# Patient Record
Sex: MALE | Race: BLACK OR AFRICAN AMERICAN | NOT HISPANIC OR LATINO | URBAN - METROPOLITAN AREA
[De-identification: names, ages, dates, MRNs, and addresses within clinical notes are randomized per-mention and may not be internally consistent; named-entity substitution may affect disease eponyms.]

---

## 2022-03-21 ENCOUNTER — LAB OUTSIDE AN ENCOUNTER (OUTPATIENT)
Dept: URBAN - METROPOLITAN AREA CLINIC 118 | Facility: CLINIC | Age: 47
End: 2022-03-21

## 2022-03-21 ENCOUNTER — OFFICE VISIT (OUTPATIENT)
Dept: URBAN - METROPOLITAN AREA CLINIC 118 | Facility: CLINIC | Age: 47
End: 2022-03-21
Payer: MEDICARE

## 2022-03-21 VITALS
HEIGHT: 71 IN | TEMPERATURE: 98.2 F | WEIGHT: 206.8 LBS | HEART RATE: 76 BPM | DIASTOLIC BLOOD PRESSURE: 80 MMHG | SYSTOLIC BLOOD PRESSURE: 128 MMHG | BODY MASS INDEX: 28.95 KG/M2

## 2022-03-21 DIAGNOSIS — K70.30 ALCOHOLIC CIRRHOSIS OF LIVER WITHOUT ASCITES: ICD-10-CM

## 2022-03-21 PROCEDURE — 99204 OFFICE O/P NEW MOD 45 MIN: CPT | Performed by: INTERNAL MEDICINE

## 2022-03-21 NOTE — HPI-TODAY'S VISIT:
reports some pruritis  S/P CCY 2022 referred for cirrhosis labs from 2021 shows no HBV/HCV needs twinrix no recent labs for MELD  reports heavy etoh in the past last etoh was about 2 years ago  PeTH 5/2021 was neg  MRI 8/2020    1. Hepatic cirrhosis with portal hypertension evidenced by collateral venous channels including paraesophageal varices and spontaneous splenorenal shunt and splenomegaly. 2. No focal liver mass identified to suggest hepatocellular carcinoma.    CT 7/2020  1. Cirrhosis with sequela of portal hypertension in the form of extensive upper abdominal varices and splenomegaly. 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 Some small ill-defined liver hypodensities are indeterminate. Recommend characterization with nonemergent contrast-enhanced abdominal MRI.   2. CBD is borderline dilated up to 8 mm with mild central intrahepatic biliary ductal dilatation. No conspicuous obstructing mass or calculus by CT. MRI/MRCP should be considered if there is concern for biliary obstruction.   3. Nonspecific hypodensity along the posterior margin of the gastric pylorus and anterior margin of the pancreatic head. Pancreatitis considered unlikely given today's normal lipase level. Findings could reflect sequela of gastritis/peptic ulcer disease. The posterior aspect of the gastric pyloric wall is ill-defined but there are no overt findings of perforation or drainable collection. Attention on follow-up imaging recommended to ensure resolution.   4. Multiple compression deformities of thoracolumbar vertebral bodies. L1 vertebral body compression deformity is age-indeterminate.   5. Other incidental findings as above.   Approved By: Cricket Salinas MD  7/11/2020 12:51 AM  [id number]   7/2020  Cholelithiasis with trace pericholecystic fluid. Mild intrahepatic biliary ductal dilatation. Findings are equivocal for acute cholecystitis. If there is clinical concern, consider HIDA scan.   Hepatic cirrhosis with a 1.8 cm solid mass in the right hepatic lobe. Recommend contrast-enhanced MRI of the abdomen for further evaluation as hepatocellular carcinoma is not excluded.  Dr HarperGD/colon 2019  POSTOPERATIVE DIAGNOSES: 1.  No polyps or masses found.  No bleeding source found.  2.  Diverticulosis, ascending and proximal transverse colon  3.  Medium sized, non-bleeding internal hemorrhoids   POSTOPERATIVE DIAGNOSES:  1.  Three columns, grade 2, non-bleeding varices with no stigmata of bleeding.   2.  Moderate portal hypertensive gastropathy, body and antrum of stomach.   3.  3 cm, clean based ulcer in duodenal bulb with small flat red spots but no visible vessels.   4.  1 cm lipoma in second portion of duodenum    RECOMMENDATIONS:  1.  Pantoprazole 40 mg PO BID  2.  Octreotide infusion at 50 mcg/hour IV for at least 72 hours  3.  Avoid aspirin and NSAID use 4.  Repeat EGD needed in 8 weeks  5.  Follow serial hgb levels.  Transfuse as needed to keep hgb > 7.  6.  Regular, high fiber, 2 gram sodium restricted diet 7.  Abstain from alcohol use.   8.  Nadolol 20 mg PO daily to reduce risk of future bleeding from esophageal varices, PHG.

## 2022-03-23 LAB
A/G RATIO: 0.9
ALBUMIN: 3.1
ALKALINE PHOSPHATASE: 226
ALT (SGPT): 24
AST (SGOT): 43
BASO (ABSOLUTE): 0
BASOS: 1
BILIRUBIN, TOTAL: 2
BUN/CREATININE RATIO: 13
BUN: 9
CALCIUM: 9.2
CARBON DIOXIDE, TOTAL: 20
CHLORIDE: 103
CREATININE: 0.69
EGFR: 114
EOS (ABSOLUTE): 0.1
EOS: 2
GGT: 41
GLOBULIN, TOTAL: 3.3
GLUCOSE: 190
HEMATOCRIT: 34.6
HEMATOLOGY COMMENTS:: (no result)
HEMOGLOBIN: 11.7
IMMATURE CELLS: (no result)
IMMATURE GRANS (ABS): 0
IMMATURE GRANULOCYTES: 0
INR: 1.3
LYMPHS (ABSOLUTE): 1.3
LYMPHS: 33
MCH: 33.1
MCHC: 33.8
MCV: 98
MONOCYTES(ABSOLUTE): 0.4
MONOCYTES: 9
NEUTROPHILS (ABSOLUTE): 2.3
NEUTROPHILS: 55
NRBC: (no result)
PLATELETS: 82
POTASSIUM: 4.1
PROTEIN, TOTAL: 6.4
PROTHROMBIN TIME: 14
RBC: 3.53
RDW: 13.1
SODIUM: 137
WBC: 4.1

## 2022-04-11 ENCOUNTER — OFFICE VISIT (OUTPATIENT)
Dept: URBAN - METROPOLITAN AREA SURGERY CENTER 23 | Facility: SURGERY CENTER | Age: 47
End: 2022-04-11

## 2022-04-12 ENCOUNTER — OFFICE VISIT (OUTPATIENT)
Dept: URBAN - METROPOLITAN AREA SURGERY CENTER 23 | Facility: SURGERY CENTER | Age: 47
End: 2022-04-12

## 2022-04-18 ENCOUNTER — TELEPHONE ENCOUNTER (OUTPATIENT)
Dept: URBAN - METROPOLITAN AREA CLINIC 118 | Facility: CLINIC | Age: 47
End: 2022-04-18

## 2022-04-18 ENCOUNTER — OFFICE VISIT (OUTPATIENT)
Dept: URBAN - METROPOLITAN AREA CLINIC 117 | Facility: CLINIC | Age: 47
End: 2022-04-18
Payer: MEDICARE

## 2022-04-18 DIAGNOSIS — K70.30 ALCOHOLIC CIRRHOSIS: ICD-10-CM

## 2022-04-18 DIAGNOSIS — K76.89 LIVER LESION: ICD-10-CM

## 2022-04-18 PROCEDURE — 76705 ECHO EXAM OF ABDOMEN: CPT | Performed by: INTERNAL MEDICINE

## 2022-04-21 ENCOUNTER — LAB OUTSIDE AN ENCOUNTER (OUTPATIENT)
Dept: URBAN - METROPOLITAN AREA CLINIC 92 | Facility: CLINIC | Age: 47
End: 2022-04-21

## 2022-04-21 ENCOUNTER — TELEPHONE ENCOUNTER (OUTPATIENT)
Dept: URBAN - METROPOLITAN AREA CLINIC 92 | Facility: CLINIC | Age: 47
End: 2022-04-21

## 2022-04-28 ENCOUNTER — OFFICE VISIT (OUTPATIENT)
Dept: URBAN - METROPOLITAN AREA SURGERY CENTER 23 | Facility: SURGERY CENTER | Age: 47
End: 2022-04-28

## 2022-04-30 ENCOUNTER — TELEPHONE ENCOUNTER (OUTPATIENT)
Dept: URBAN - METROPOLITAN AREA CLINIC 121 | Facility: CLINIC | Age: 47
End: 2022-04-30

## 2022-05-01 ENCOUNTER — TELEPHONE ENCOUNTER (OUTPATIENT)
Dept: URBAN - METROPOLITAN AREA CLINIC 121 | Facility: CLINIC | Age: 47
End: 2022-05-01

## 2022-05-02 ENCOUNTER — CLAIMS CREATED FROM THE CLAIM WINDOW (OUTPATIENT)
Dept: URBAN - METROPOLITAN AREA CLINIC 4 | Facility: CLINIC | Age: 47
End: 2022-05-02
Payer: MEDICARE

## 2022-05-02 ENCOUNTER — OFFICE VISIT (OUTPATIENT)
Dept: URBAN - METROPOLITAN AREA SURGERY CENTER 23 | Facility: SURGERY CENTER | Age: 47
End: 2022-05-02
Payer: MEDICARE

## 2022-05-02 DIAGNOSIS — K21.9 ACID REFLUX: ICD-10-CM

## 2022-05-02 DIAGNOSIS — K29.60 OTHER GASTRITIS WITHOUT BLEEDING: ICD-10-CM

## 2022-05-02 DIAGNOSIS — K26.9 CHILDHOOD DUODENAL ULCER: ICD-10-CM

## 2022-05-02 DIAGNOSIS — K21.9 GASTRO-ESOPHAGEAL REFLUX DISEASE WITHOUT ESOPHAGITIS: ICD-10-CM

## 2022-05-02 DIAGNOSIS — K29.60 ADENOPAPILLOMATOSIS GASTRICA: ICD-10-CM

## 2022-05-02 DIAGNOSIS — K76.6 CLINICALLY SIGNIFICANT PORTAL HYPERTENSION: ICD-10-CM

## 2022-05-02 DIAGNOSIS — K31.89 ACQUIRED DEFORMITY OF DUODENUM: ICD-10-CM

## 2022-05-02 DIAGNOSIS — B96.81 HELICOBACTER PYLORI [H. PYLORI] AS THE CAUSE OF DISEASES CLASSIFIED ELSEWHERE: ICD-10-CM

## 2022-05-02 DIAGNOSIS — B96.81 BACTERIAL INFECTION DUE TO H. PYLORI: ICD-10-CM

## 2022-05-02 DIAGNOSIS — I85.10 ESOPH VARICE OTHER DIS: ICD-10-CM

## 2022-05-02 PROCEDURE — 43239 EGD BIOPSY SINGLE/MULTIPLE: CPT | Performed by: INTERNAL MEDICINE

## 2022-05-02 PROCEDURE — 88342 IMHCHEM/IMCYTCHM 1ST ANTB: CPT | Performed by: PATHOLOGY

## 2022-05-02 PROCEDURE — 88312 SPECIAL STAINS GROUP 1: CPT | Performed by: PATHOLOGY

## 2022-05-02 PROCEDURE — 88305 TISSUE EXAM BY PATHOLOGIST: CPT | Performed by: PATHOLOGY

## 2022-05-02 PROCEDURE — G8907 PT DOC NO EVENTS ON DISCHARG: HCPCS | Performed by: INTERNAL MEDICINE

## 2022-05-02 RX ORDER — PANTOPRAZOLE SODIUM 40 MG/1
1 TABLET TABLET, DELAYED RELEASE ORAL ONCE A DAY
Qty: 90 | Refills: 3 | OUTPATIENT
Start: 2022-05-02

## 2022-06-13 ENCOUNTER — OFFICE VISIT (OUTPATIENT)
Dept: URBAN - METROPOLITAN AREA CLINIC 118 | Facility: CLINIC | Age: 47
End: 2022-06-13

## 2022-06-13 RX ORDER — PANTOPRAZOLE SODIUM 40 MG/1
1 TABLET TABLET, DELAYED RELEASE ORAL ONCE A DAY
Qty: 90 | Refills: 3 | Status: ACTIVE | COMMUNITY
Start: 2022-05-02

## 2022-06-14 ENCOUNTER — TELEPHONE ENCOUNTER (OUTPATIENT)
Dept: URBAN - METROPOLITAN AREA CLINIC 118 | Facility: CLINIC | Age: 47
End: 2022-06-14

## 2022-06-20 ENCOUNTER — OFFICE VISIT (OUTPATIENT)
Dept: URBAN - METROPOLITAN AREA CLINIC 118 | Facility: CLINIC | Age: 47
End: 2022-06-20
Payer: MEDICARE

## 2022-06-20 ENCOUNTER — LAB OUTSIDE AN ENCOUNTER (OUTPATIENT)
Dept: URBAN - METROPOLITAN AREA CLINIC 118 | Facility: CLINIC | Age: 47
End: 2022-06-20

## 2022-06-20 ENCOUNTER — OFFICE VISIT (OUTPATIENT)
Dept: URBAN - METROPOLITAN AREA CLINIC 118 | Facility: CLINIC | Age: 47
End: 2022-06-20

## 2022-06-20 VITALS
TEMPERATURE: 98.1 F | WEIGHT: 198.4 LBS | HEART RATE: 77 BPM | HEIGHT: 71 IN | BODY MASS INDEX: 27.77 KG/M2 | DIASTOLIC BLOOD PRESSURE: 68 MMHG | SYSTOLIC BLOOD PRESSURE: 113 MMHG

## 2022-06-20 DIAGNOSIS — A04.8 H. PYLORI INFECTION: ICD-10-CM

## 2022-06-20 DIAGNOSIS — K26.9 DUODENAL ULCER: ICD-10-CM

## 2022-06-20 DIAGNOSIS — I85.00 ESOPHAGEAL VARICES DETERMINED BY ENDOSCOPY: ICD-10-CM

## 2022-06-20 DIAGNOSIS — K76.9 LIVER LESION: ICD-10-CM

## 2022-06-20 DIAGNOSIS — K70.30 ALCOHOLIC CIRRHOSIS OF LIVER WITHOUT ASCITES: ICD-10-CM

## 2022-06-20 PROCEDURE — 99214 OFFICE O/P EST MOD 30 MIN: CPT | Performed by: INTERNAL MEDICINE

## 2022-06-20 RX ORDER — AMOXICILLIN 500 MG/1
2 CAPSULES CAPSULE ORAL
Qty: 56 CAPSULE | Refills: 0 | OUTPATIENT
Start: 2022-06-20 | End: 2022-07-04

## 2022-06-20 RX ORDER — PANTOPRAZOLE SODIUM 40 MG/1
1 TABLET TABLET, DELAYED RELEASE ORAL ONCE A DAY
Qty: 90 | Refills: 3 | Status: ACTIVE | COMMUNITY
Start: 2022-05-02

## 2022-06-20 RX ORDER — CLARITHROMYCIN 500 MG/1
1 TABLET TABLET, FILM COATED ORAL
Qty: 28 TABLET | Refills: 0 | OUTPATIENT
Start: 2022-06-20 | End: 2022-07-04

## 2022-06-20 RX ORDER — OMEPRAZOLE 20 MG/1
1 CAPSULE CAPSULE, DELAYED RELEASE ORAL BID
Qty: 28 CAPSULE | Refills: 0 | OUTPATIENT
Start: 2022-06-20

## 2022-06-20 NOTE — HPI-TODAY'S VISIT:
here to treat his HPylori I diagnosed still reports some pruritis reports abd bloating   TO REVIEW S/P CCY 2022 referred for cirrhosis labs from 2021 shows no HBV/HCV needs twinrix no recent labs for MELD  reports heavy etoh in the past last etoh was about 2 years ago  PeTH 5/2021 was neg  MRI 8/2020    1. Hepatic cirrhosis with portal hypertension evidenced by collateral venous channels including paraesophageal varices and spontaneous splenorenal shunt and splenomegaly. 2. No focal liver mass identified to suggest hepatocellular carcinoma.    CT 7/2020  1. Cirrhosis with sequela of portal hypertension in the form of extensive upper abdominal varices and splenomegaly. 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 Some small ill-defined liver hypodensities are indeterminate. Recommend characterization with nonemergent contrast-enhanced abdominal MRI.   2. CBD is borderline dilated up to 8 mm with mild central intrahepatic biliary ductal dilatation. No conspicuous obstructing mass or calculus by CT. MRI/MRCP should be considered if there is concern for biliary obstruction.   3. Nonspecific hypodensity along the posterior margin of the gastric pylorus and anterior margin of the pancreatic head. Pancreatitis considered unlikely given today's normal lipase level. Findings could reflect sequela of gastritis/peptic ulcer disease. The posterior aspect of the gastric pyloric wall is ill-defined but there are no overt findings of perforation or drainable collection. Attention on follow-up imaging recommended to ensure resolution.   4. Multiple compression deformities of thoracolumbar vertebral bodies. L1 vertebral body compression deformity is age-indeterminate.   5. Other incidental findings as above.   Approved By: Cricket Salinas MD  7/11/2020 12:51 AM  [id number]   7/2020  Cholelithiasis with trace pericholecystic fluid. Mild intrahepatic biliary ductal dilatation. Findings are equivocal for acute cholecystitis. If there is clinical concern, consider HIDA scan.   Hepatic cirrhosis with a 1.8 cm solid mass in the right hepatic lobe. Recommend contrast-enhanced MRI of the abdomen for further evaluation as hepatocellular carcinoma is not excluded.  Dr HarperGD/colon 2019  POSTOPERATIVE DIAGNOSES: 1.  No polyps or masses found.  No bleeding source found.  2.  Diverticulosis, ascending and proximal transverse colon  3.  Medium sized, non-bleeding internal hemorrhoids   POSTOPERATIVE DIAGNOSES:  1.  Three columns, grade 2, non-bleeding varices with no stigmata of bleeding.   2.  Moderate portal hypertensive gastropathy, body and antrum of stomach.   3.  3 cm, clean based ulcer in duodenal bulb with small flat red spots but no visible vessels.   4.  1 cm lipoma in second portion of duodenum    RECOMMENDATIONS:  1.  Pantoprazole 40 mg PO BID  2.  Octreotide infusion at 50 mcg/hour IV for at least 72 hours  3.  Avoid aspirin and NSAID use 4.  Repeat EGD needed in 8 weeks  5.  Follow serial hgb levels.  Transfuse as needed to keep hgb > 7.  6.  Regular, high fiber, 2 gram sodium restricted diet 7.  Abstain from alcohol use.   8.  Nadolol 20 mg PO daily to reduce risk of future bleeding from esophageal varices, PHG.

## 2022-07-19 ENCOUNTER — HOSPITAL ENCOUNTER (EMERGENCY)
Dept: HOSPITAL 5 - ED | Age: 47
LOS: 1 days | Discharge: HOME | End: 2022-07-20
Payer: MEDICARE

## 2022-07-19 DIAGNOSIS — F10.20: ICD-10-CM

## 2022-07-19 DIAGNOSIS — R07.9: Primary | ICD-10-CM

## 2022-07-19 DIAGNOSIS — E11.9: ICD-10-CM

## 2022-07-19 PROCEDURE — 99283 EMERGENCY DEPT VISIT LOW MDM: CPT

## 2022-07-19 PROCEDURE — 93005 ELECTROCARDIOGRAM TRACING: CPT

## 2022-07-19 PROCEDURE — 71046 X-RAY EXAM CHEST 2 VIEWS: CPT

## 2022-07-20 VITALS — SYSTOLIC BLOOD PRESSURE: 136 MMHG | DIASTOLIC BLOOD PRESSURE: 84 MMHG

## 2022-07-20 NOTE — XRAY REPORT
CHEST 2 VIEWS 



INDICATION / CLINICAL INFORMATION: chest pain. 



COMPARISON: None available.



FINDINGS:



SUPPORT DEVICES: None.

HEART / MEDIASTINUM: Heart size and mediastinal contour appear within normal limits. 

LUNGS / PLEURA: No significant pulmonary or pleural abnormality. No pneumothorax. 

BONES: No significant osseous abnormality.

ADDITIONAL FINDINGS: No significant additional findings.



IMPRESSION:

1. No active cardiopulmonary disease.



Signer Name: Avel Holbrook II, MD 

Signed: 7/20/2022 3:42 AM

Workstation Name: [id number]

## 2022-07-20 NOTE — EMERGENCY DEPARTMENT REPORT
ED General Adult HPI





- General


Chief complaint: Pain General


Stated complaint: PAIN ACROSS CHEST X 2 WEEKS


Time Seen by Provider: 22 04:49


Source: patient


Mode of arrival: Ambulatory


Limitations: No Limitations





- History of Present Illness


Initial comments: 





46-year-old male presents emerged part complaining of a near 1 week history of 

right-sided chest pain that radiates across to the shoulder worse with deep 

breaths palpation and range of motion.  Chest pain associate with occasional 

cough but no hemoptysis no hematemesis hematochezia no wheezing no rhonchi


-: Gradual


Location: chest


Radiation: non-radiation


Quality: aching, sharp


Consistency: constant


Worsens with: movement ( and deep breathes)





- Related Data


                                  Previous Rx's











 Medication  Instructions  Recorded  Last Taken  Type


 


Ketorolac [Toradol] 10 mg PO Q6H PRN #14 22 Unknown Rx











                                    Allergies











Allergy/AdvReac Type Severity Reaction Status Date / Time


 


No Known Allergies Allergy   Verified 22 18:08














ED Review of Systems


ROS: 


Stated complaint: PAIN ACROSS CHEST X 2 WEEKS


Other details as noted in HPI





Comment: All other systems reviewed and negative





ED Past Medical Hx





- Past Medical History


Hx Diabetes: Yes


Additional medical history: Cirrhosis





- Surgical History


Hx Cholecystectomy: Yes





- Social History


Smoking Status: Never Smoker


Substance Use Type: Alcohol





- Medications


Home Medications: 


                                Home Medications











 Medication  Instructions  Recorded  Confirmed  Last Taken  Type


 


Ketorolac [Toradol] 10 mg PO Q6H PRN #14 22  Unknown Rx














ED Physical Exam





- General


Limitations: No Limitations


General appearance: alert, in no apparent distress





- Head


Head exam: Present: atraumatic, normocephalic





- Eye


Eye exam: Present: normal appearance, PERRL, EOMI.  Absent: scleral icterus, 

periorbital swelling, other


Pupils: Present: normal accommodation





- ENT


ENT exam: Present: normal exam, mucous membranes moist, TM's normal bilaterally





- Neck


Neck exam: Present: normal inspection, full ROM





- Respiratory


Respiratory exam: Present: normal lung sounds bilaterally, chest wall 

tenderness.  Absent: respiratory distress, wheezes, rales, rhonchi, accessory 

muscle use, decreased breath sounds





- Cardiovascular


Cardiovascular Exam: Present: regular rate, normal rhythm.  Absent: systolic 

murmur, diastolic murmur, rubs, gallop





- GI/Abdominal


GI/Abdominal exam: Present: soft, normal bowel sounds





- Rectal


Rectal exam: Present: deferred





- Extremities Exam


Extremities exam: Present: normal inspection





- Back Exam


Back exam: Present: normal inspection





- Neurological Exam


Neurological exam: Present: alert, oriented X3, CN II-XII intact, normal gait





- Psychiatric


Psychiatric exam: Present: normal affect, normal mood





- Skin


Skin exam: Present: warm, dry, intact, normal color.  Absent: rash, diaphoretic,

 erythema





ED Course





                                   Vital Signs











  22





  18:03


 


Temperature 98.9 F


 


Pulse Rate 98 H


 


Respiratory 14





Rate 


 


Blood Pressure 120/81


 


O2 Sat by Pulse 98





Oximetry 














ED Medical Decision Making





- Radiology Data





[facility]  


                                     [address] [place] [address]  


 


                                            XRay Report   


                                               Signed  


 


Patient: SUDEEP AGUIRRE                                                          

      MR#: [id number]  


2          


: 1975                                                                

Acct:[id number]      


 


Age/Sex: 46 / M                                                                

ADM Date: 22     


 


Loc: ED       


Attending Dr:   


 


 


Ordering Physician: ZOILA MCLAUGHLIN  


Date of Service: 22  


Procedure(s): XR chest routine 2V  


Accession Number(s): [id number]  


 


cc: ZOILA MCLAUGHLIN   


 


Fluoro Time In Minutes:   


 


CHEST 2 VIEWS   


 


 INDICATION / CLINICAL INFORMATION: chest pain.   


 


 COMPARISON: None available.  


 


 FINDINGS:  


 


 SUPPORT DEVICES: None.  


 HEART / MEDIASTINUM: Heart size and mediastinal contour appear within normal 

limits.   


 LUNGS / PLEURA: No significant pulmonary or pleural abnormality. No 

pneumothorax.   


 BONES: No significant osseous abnormality.  


 ADDITIONAL FINDINGS: No significant additional findings.  


 


 IMPRESSION:  


 1. No active cardiopulmonary disease.  


 


 Signer Name: Erin Nagy II, MD   


 Signed: 2022 3:42 AM  


 Workstation Name: [id number]   


 


 


Transcribed By: JAH  


Dictated By: ERIN NAGY II, MD  


Electronically Authenticated By: ERIN NAGY II, MD    


Signed Date/Time: 22                                


 


 


 


DD/DT: 22                                                            

  


TD/TT:





- Medical Decision Making





This patient presents with chest pain that is very unlikely angina or acute 

coronary syndrome. The emergency department evaluation has not identified any 

cause for suspicion that this chest pain has a cardiac etiology. Based on their 

history, EKG (which showed no evidence of ischemia or infarction) and imaging, 

in addition to the patient's physical exam, I see no evidence at this time for a

 malignant etiology for the patient's chest pain. There is no acute evidence for

 pulmonary embolus, acute myocardial infarction, pneumothorax, Boerhaeve 

syndrome, cardiac tamponade, thoracic artery dissection, or any other emergent 

cardiac, pulmonary or aortic pathology. Given the low pre-test probability for 

cardiac etiology of chest pain and the absence of any sign of ischemia or 

infarction, discharge for outpatient follow-up and further evaluation is 

reasonable.





I have explained to the patient that even though a cardiac problem is very 

unlikely, follow-up and further testing is required to reduce further the 

already small uncertainty that exists. Other life-threatening diagnoses have 

been considered. The patient understands the need to return immediately if their

 symptoms worsen or they develop any new symptoms, and not to engage in any 

significant exertional activity until follow-up is obtained.


Critical care attestation.: 


If time is entered above; I have spent that time in minutes in the direct care 

of this critically ill patient, excluding procedure time.








ED Disposition


Clinical Impression: 


 Chest pain





Disposition: 01 HOME / SELF CARE / HOMELESS


Is pt being admited?: No


Does the pt Need Aspirin: No


Condition: Stable


Instructions:  Nonspecific Chest Pain, Adult, Costochondritis


Prescriptions: 


Ketorolac [Toradol] 10 mg PO Q6H PRN #14


 PRN Reason: Pain


Referrals: 


ZULEYKA DALEY MD [Staff Physician] - 3-5 Days

## 2022-07-22 ENCOUNTER — TELEPHONE ENCOUNTER (OUTPATIENT)
Dept: URBAN - METROPOLITAN AREA CLINIC 118 | Facility: CLINIC | Age: 47
End: 2022-07-22

## 2022-07-22 NOTE — ELECTROCARDIOGRAPH REPORT
[facility]

                                       

Test Date:    2022               Test Time:    18:12:06

Pat Name:     SUDEEP AGUIRRE            Department:   

Patient ID:   [id number]          Room:          

Gender:       M                        Technician:   [email or web address]

:          1975               Requested By: MAC PIEDRA

Order Number: [id number]              Reading MD:   Shonda Hickey

                                 Measurements

Intervals                              Axis          

Rate:         86                       P:            5

SD:           157                      QRS:          -17

QRSD:         93                       T:            64

QT:           397                                    

QTc:          475                                    

                           Interpretive Statements

Sinus bradycardia

Prolonged SD interval

Poor R wave progression

Left ventricular hypertrophy

No previous ECG available for comparison

Electronically Signed On 2022 14:03:12 EDT by Shonda Hickey

## 2022-07-25 ENCOUNTER — LAB OUTSIDE AN ENCOUNTER (OUTPATIENT)
Dept: URBAN - METROPOLITAN AREA CLINIC 118 | Facility: CLINIC | Age: 47
End: 2022-07-25

## 2022-07-31 ENCOUNTER — TELEPHONE ENCOUNTER (OUTPATIENT)
Dept: URBAN - METROPOLITAN AREA CLINIC 92 | Facility: CLINIC | Age: 47
End: 2022-07-31

## 2022-08-15 ENCOUNTER — LAB OUTSIDE AN ENCOUNTER (OUTPATIENT)
Dept: URBAN - METROPOLITAN AREA CLINIC 118 | Facility: CLINIC | Age: 47
End: 2022-08-15

## 2022-08-15 ENCOUNTER — OFFICE VISIT (OUTPATIENT)
Dept: URBAN - METROPOLITAN AREA CLINIC 118 | Facility: CLINIC | Age: 47
End: 2022-08-15
Payer: MEDICARE

## 2022-08-15 VITALS
HEART RATE: 78 BPM | SYSTOLIC BLOOD PRESSURE: 117 MMHG | TEMPERATURE: 98.1 F | WEIGHT: 196.4 LBS | DIASTOLIC BLOOD PRESSURE: 79 MMHG | BODY MASS INDEX: 27.5 KG/M2 | HEIGHT: 71 IN

## 2022-08-15 DIAGNOSIS — K76.9 LIVER LESION: ICD-10-CM

## 2022-08-15 DIAGNOSIS — I85.00 ESOPHAGEAL VARICES DETERMINED BY ENDOSCOPY: ICD-10-CM

## 2022-08-15 DIAGNOSIS — A04.8 H. PYLORI INFECTION: ICD-10-CM

## 2022-08-15 DIAGNOSIS — C22.0 HCC (HEPATOCELLULAR CARCINOMA): ICD-10-CM

## 2022-08-15 DIAGNOSIS — K26.9 DUODENAL ULCER: ICD-10-CM

## 2022-08-15 DIAGNOSIS — K70.30 ALCOHOLIC CIRRHOSIS OF LIVER WITHOUT ASCITES: ICD-10-CM

## 2022-08-15 DIAGNOSIS — D49.0 IPMN (INTRADUCTAL PAPILLARY MUCINOUS NEOPLASM): ICD-10-CM

## 2022-08-15 PROCEDURE — 99214 OFFICE O/P EST MOD 30 MIN: CPT | Performed by: INTERNAL MEDICINE

## 2022-08-15 RX ORDER — OMEPRAZOLE 20 MG/1
1 CAPSULE CAPSULE, DELAYED RELEASE ORAL BID
Qty: 28 CAPSULE | Refills: 0 | OUTPATIENT

## 2022-08-15 RX ORDER — OMEPRAZOLE 20 MG/1
1 CAPSULE CAPSULE, DELAYED RELEASE ORAL BID
Qty: 28 CAPSULE | Refills: 0 | Status: ACTIVE | COMMUNITY
Start: 2022-06-20

## 2022-08-15 RX ORDER — PANTOPRAZOLE SODIUM 40 MG/1
1 TABLET TABLET, DELAYED RELEASE ORAL ONCE A DAY
Qty: 90 | Refills: 3 | Status: ACTIVE | COMMUNITY
Start: 2022-05-02

## 2022-08-15 NOTE — HPI-TODAY'S VISIT:
I found HCC, referred to [facility] transplant: [facility] [facility] [place] Multidisciplinary Liver Cancer Conference Summary Report   Rell Luis was discussed at our weekly multidisciplinary liver cancer conference using [employer] on 7/26/2022.   We presented and reviewed the patient's MRI 7/14/2022 with interventional radiologist Dr. Honorio Rojas and have the following findings and recommended treatment plan:     The images revealed:  -Arterial enhancing lesion with washout, located near heart. LR-5     Main portal vein patent?: yes, but not mentioned in discussion.             If PV thrombus, is it tumor thrombus?: not applicable  HCC within Basil Criteria?: not applicable; NAC for transplant.     The multidisciplinary care recommendation is:  Pt needs to schedule office visit with Dr. Rosales in next 1-2 weeks. Plan for Y-90 if tbili ok. If tbili Marginal, plan for TACE.   Note routed to primary team to inform patient of above.   Electronically signed by Kimberley Sorto RN on 7/27/2022 10:45 AM.    here to treat his HPylori I diagnosed still reports some pruritis reports abd bloating   TO REVIEW S/P CCY 2022 referred for cirrhosis labs from 2021 shows no HBV/HCV needs twinrix no recent labs for MELD  reports heavy etoh in the past last etoh was about 2 years ago  PeTH 5/2021 was neg  MRI 8/2020    1. Hepatic cirrhosis with portal hypertension evidenced by collateral venous channels including paraesophageal varices and spontaneous splenorenal shunt and splenomegaly. 2. No focal liver mass identified to suggest hepatocellular carcinoma.    CT 7/2020  1. Cirrhosis with sequela of portal hypertension in the form of extensive upper abdominal varices and splenomegaly. 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 Some small ill-defined liver hypodensities are indeterminate. Recommend characterization with nonemergent contrast-enhanced abdominal MRI.   2. CBD is borderline dilated up to 8 mm with mild central intrahepatic biliary ductal dilatation. No conspicuous obstructing mass or calculus by CT. MRI/MRCP should be considered if there is concern for biliary obstruction.   3. Nonspecific hypodensity along the posterior margin of the gastric pylorus and anterior margin of the pancreatic head. Pancreatitis considered unlikely given today's normal lipase level. Findings could reflect sequela of gastritis/peptic ulcer disease. The posterior aspect of the gastric pyloric wall is ill-defined but there are no overt findings of perforation or drainable collection. Attention on follow-up imaging recommended to ensure resolution.   4. Multiple compression deformities of thoracolumbar vertebral bodies. L1 vertebral body compression deformity is age-indeterminate.   5. Other incidental findings as above.   Approved By: Cricket Salinas MD  7/11/2020 12:51 AM  [id number]   7/2020  Cholelithiasis with trace pericholecystic fluid. Mild intrahepatic biliary ductal dilatation. Findings are equivocal for acute cholecystitis. If there is clinical concern, consider HIDA scan.   Hepatic cirrhosis with a 1.8 cm solid mass in the right hepatic lobe. Recommend contrast-enhanced MRI of the abdomen for further evaluation as hepatocellular carcinoma is not excluded.  Dr HarperGD/colon 2019  POSTOPERATIVE DIAGNOSES: 1.  No polyps or masses found.  No bleeding source found.  2.  Diverticulosis, ascending and proximal transverse colon  3.  Medium sized, non-bleeding internal hemorrhoids   POSTOPERATIVE DIAGNOSES:  1.  Three columns, grade 2, non-bleeding varices with no stigmata of bleeding.   2.  Moderate portal hypertensive gastropathy, body and antrum of stomach.   3.  3 cm, clean based ulcer in duodenal bulb with small flat red spots but no visible vessels.   4.  1 cm lipoma in second portion of duodenum    RECOMMENDATIONS:  1.  Pantoprazole 40 mg PO BID  2.  Octreotide infusion at 50 mcg/hour IV for at least 72 hours  3.  Avoid aspirin and NSAID use 4.  Repeat EGD needed in 8 weeks  5.  Follow serial hgb levels.  Transfuse as needed to keep hgb > 7.  6.  Regular, high fiber, 2 gram sodium restricted diet 7.  Abstain from alcohol use.   8.  Nadolol 20 mg PO daily to reduce risk of future bleeding from esophageal varices, PHG.

## 2022-10-28 LAB
ABSOLUTE BASOPHILS: 19
ABSOLUTE EOSINOPHILS: 41
ABSOLUTE LYMPHOCYTES: 662
ABSOLUTE MONOCYTES: 338
ABSOLUTE NEUTROPHILS: 1642
AFP, SERUM, TUMOR MARKER: 3.1
ALBUMIN/GLOBULIN RATIO: 0.8
ALBUMIN: 2.8
ALKALINE PHOSPHATASE: 159
ALT (SGPT): 27
AST (SGOT): 40
BASOPHILS: 0.7
BILIRUBIN, DIRECT: 1
BILIRUBIN, INDIRECT: 1.9
BILIRUBIN, TOTAL: 2.9
BUN/CREATININE RATIO: (no result)
CALCIUM: 8.4
CARBON DIOXIDE: 24
CHLORIDE: 109
CREATININE: 0.66
EGFR: 115
EOSINOPHILS: 1.5
GLOBULIN: 3.3
GLUCOSE: 131
HEMATOCRIT: 31.6
HEMOGLOBIN: 11.1
INR: 1.4
LYMPHOCYTES: 24.5
MCH: 32.3
MCHC: 35.1
MCV: 91.9
MONOCYTES: 12.5
MPV: 11.9
NEUTROPHILS: 60.8
PLATELET COUNT: 101
POTASSIUM: 4.4
PROTEIN, TOTAL: 6.1
PT: 14.6
RDW: 15.7
RED BLOOD CELL COUNT: 3.44
SODIUM: 141
UREA NITROGEN (BUN): 7
WHITE BLOOD CELL COUNT: 2.7

## 2022-10-31 ENCOUNTER — LAB OUTSIDE AN ENCOUNTER (OUTPATIENT)
Dept: URBAN - METROPOLITAN AREA CLINIC 118 | Facility: CLINIC | Age: 47
End: 2022-10-31

## 2022-10-31 ENCOUNTER — OFFICE VISIT (OUTPATIENT)
Dept: URBAN - METROPOLITAN AREA CLINIC 118 | Facility: CLINIC | Age: 47
End: 2022-10-31
Payer: MEDICARE

## 2022-10-31 VITALS
HEART RATE: 81 BPM | WEIGHT: 191.6 LBS | HEIGHT: 71 IN | SYSTOLIC BLOOD PRESSURE: 130 MMHG | TEMPERATURE: 98.2 F | BODY MASS INDEX: 26.82 KG/M2 | DIASTOLIC BLOOD PRESSURE: 81 MMHG

## 2022-10-31 DIAGNOSIS — K76.9 LIVER LESION: ICD-10-CM

## 2022-10-31 DIAGNOSIS — K26.9 DUODENAL ULCER: ICD-10-CM

## 2022-10-31 DIAGNOSIS — I85.00 ESOPHAGEAL VARICES DETERMINED BY ENDOSCOPY: ICD-10-CM

## 2022-10-31 DIAGNOSIS — A04.8 H. PYLORI INFECTION: ICD-10-CM

## 2022-10-31 DIAGNOSIS — D49.0 IPMN (INTRADUCTAL PAPILLARY MUCINOUS NEOPLASM): ICD-10-CM

## 2022-10-31 DIAGNOSIS — K70.30 ALCOHOLIC CIRRHOSIS OF LIVER WITHOUT ASCITES: ICD-10-CM

## 2022-10-31 DIAGNOSIS — C22.0 HCC (HEPATOCELLULAR CARCINOMA): ICD-10-CM

## 2022-10-31 PROCEDURE — 99213 OFFICE O/P EST LOW 20 MIN: CPT | Performed by: INTERNAL MEDICINE

## 2022-10-31 RX ORDER — PANTOPRAZOLE SODIUM 40 MG/1
1 TABLET TABLET, DELAYED RELEASE ORAL ONCE A DAY
Qty: 90 | Refills: 3 | Status: ACTIVE | COMMUNITY
Start: 2022-05-02

## 2022-10-31 RX ORDER — OMEPRAZOLE 20 MG/1
1 CAPSULE CAPSULE, DELAYED RELEASE ORAL BID
Qty: 28 CAPSULE | Refills: 0 | Status: ACTIVE | COMMUNITY

## 2022-10-31 RX ORDER — LACTULOSE 10 G/15ML
15 ML SOLUTION ORAL ONCE A DAY
Qty: 1350 ML | Refills: 3

## 2022-10-31 NOTE — HPI-TODAY'S VISIT:
I found HCC, referred to [facility] transplant: had ablation 10/2022  Prior notes: [facility] Multidisciplinary Liver Cancer Conference Summary Report   Rell Luis was discussed at our weekly multidisciplinary liver cancer conference using [employer] on 7/26/2022.   We presented and reviewed the patient's MRI 7/14/2022 with interventional radiologist Dr. Honorio Rojas and have the following findings and recommended treatment plan:     The images revealed:  -Arterial enhancing lesion with washout, located near heart. LR-5     Main portal vein patent?: yes, but not mentioned in discussion.             If PV thrombus, is it tumor thrombus?: not applicable  HCC within Basil Criteria?: not applicable; NAC for transplant.     The multidisciplinary care recommendation is:  Pt needs to schedule office visit with Dr. Rosales in next 1-2 weeks. Plan for Y-90 if tbili ok. If tbili Marginal, plan for TACE.   Note routed to primary team to inform patient of above.   Electronically signed by Kimberley Sorto RN on 7/27/2022 10:45 AM.    here to treat his HPylori I diagnosed still reports some pruritis reports abd bloating   TO REVIEW S/P CCY 2022 referred for cirrhosis labs from 2021 shows no HBV/HCV needs twinrix no recent labs for MELD  reports heavy etoh in the past last etoh was about 2 years ago  PeTH 5/2021 was neg  MRI 8/2020    1. Hepatic cirrhosis with portal hypertension evidenced by collateral venous channels including paraesophageal varices and spontaneous splenorenal shunt and splenomegaly. 2. No focal liver mass identified to suggest hepatocellular carcinoma.    CT 7/2020  1. Cirrhosis with sequela of portal hypertension in the form of extensive upper abdominal varices and splenomegaly. 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 Some small ill-defined liver hypodensities are indeterminate. Recommend characterization with nonemergent contrast-enhanced abdominal MRI.   2. CBD is borderline dilated up to 8 mm with mild central intrahepatic biliary ductal dilatation. No conspicuous obstructing mass or calculus by CT. MRI/MRCP should be considered if there is concern for biliary obstruction.   3. Nonspecific hypodensity along the posterior margin of the gastric pylorus and anterior margin of the pancreatic head. Pancreatitis considered unlikely given today's normal lipase level. Findings could reflect sequela of gastritis/peptic ulcer disease. The posterior aspect of the gastric pyloric wall is ill-defined but there are no overt findings of perforation or drainable collection. Attention on follow-up imaging recommended to ensure resolution.   4. Multiple compression deformities of thoracolumbar vertebral bodies. L1 vertebral body compression deformity is age-indeterminate.   5. Other incidental findings as above.   Approved By: Cricket Salinas MD  7/11/2020 12:51 AM  [id number]   7/2020  Cholelithiasis with trace pericholecystic fluid. Mild intrahepatic biliary ductal dilatation. Findings are equivocal for acute cholecystitis. If there is clinical concern, consider HIDA scan.   Hepatic cirrhosis with a 1.8 cm solid mass in the right hepatic lobe. Recommend contrast-enhanced MRI of the abdomen for further evaluation as hepatocellular carcinoma is not excluded.  Dr HarperGD/colon 2019  POSTOPERATIVE DIAGNOSES: 1.  No polyps or masses found.  No bleeding source found.  2.  Diverticulosis, ascending and proximal transverse colon  3.  Medium sized, non-bleeding internal hemorrhoids   POSTOPERATIVE DIAGNOSES:  1.  Three columns, grade 2, non-bleeding varices with no stigmata of bleeding.   2.  Moderate portal hypertensive gastropathy, body and antrum of stomach.   3.  3 cm, clean based ulcer in duodenal bulb with small flat red spots but no visible vessels.   4.  1 cm lipoma in second portion of duodenum    RECOMMENDATIONS:  1.  Pantoprazole 40 mg PO BID  2.  Octreotide infusion at 50 mcg/hour IV for at least 72 hours  3.  Avoid aspirin and NSAID use 4.  Repeat EGD needed in 8 weeks  5.  Follow serial hgb levels.  Transfuse as needed to keep hgb > 7.  6.  Regular, high fiber, 2 gram sodium restricted diet 7.  Abstain from alcohol use.   8.  Nadolol 20 mg PO daily to reduce risk of future bleeding from esophageal varices, PHG.

## 2022-11-01 ENCOUNTER — ERX REFILL RESPONSE (OUTPATIENT)
Dept: URBAN - METROPOLITAN AREA CLINIC 118 | Facility: CLINIC | Age: 47
End: 2022-11-01

## 2022-11-01 RX ORDER — PANTOPRAZOLE SODIUM 40 MG/1
1 TABLET TABLET, DELAYED RELEASE ORAL ONCE A DAY
Qty: 90 | Refills: 3 | OUTPATIENT

## 2022-11-01 RX ORDER — PANTOPRAZOLE SODIUM 40 MG/1
TAKE 1 TABLET BY MOUTH EVERY DAY TABLET, DELAYED RELEASE ORAL
Qty: 90 TABLET | Refills: 0 | OUTPATIENT

## 2022-11-02 ENCOUNTER — OFFICE VISIT (OUTPATIENT)
Dept: URBAN - METROPOLITAN AREA CLINIC 117 | Facility: CLINIC | Age: 47
End: 2022-11-02

## 2022-11-03 ENCOUNTER — TELEPHONE ENCOUNTER (OUTPATIENT)
Dept: URBAN - METROPOLITAN AREA CLINIC 118 | Facility: CLINIC | Age: 47
End: 2022-11-03

## 2022-11-03 ENCOUNTER — LAB OUTSIDE AN ENCOUNTER (OUTPATIENT)
Dept: URBAN - METROPOLITAN AREA CLINIC 6 | Facility: CLINIC | Age: 47
End: 2022-11-03

## 2022-11-03 ENCOUNTER — WEB ENCOUNTER (OUTPATIENT)
Dept: URBAN - METROPOLITAN AREA CLINIC 6 | Facility: CLINIC | Age: 47
End: 2022-11-03

## 2022-11-04 ENCOUNTER — LAB OUTSIDE AN ENCOUNTER (OUTPATIENT)
Dept: URBAN - METROPOLITAN AREA CLINIC 109 | Facility: CLINIC | Age: 47
End: 2022-11-04

## 2022-11-07 ENCOUNTER — OFFICE VISIT (OUTPATIENT)
Dept: URBAN - METROPOLITAN AREA CLINIC 117 | Facility: CLINIC | Age: 47
End: 2022-11-07

## 2022-11-07 LAB — HELICOBACTER PYLORI AG, EIA, STOOL: (no result)

## 2022-11-08 ENCOUNTER — TELEPHONE ENCOUNTER (OUTPATIENT)
Dept: URBAN - METROPOLITAN AREA CLINIC 118 | Facility: CLINIC | Age: 47
End: 2022-11-08

## 2022-11-08 RX ORDER — OMEPRAZOLE 20 MG/1
1 CAPSULE CAPSULE, DELAYED RELEASE ORAL BID
Qty: 28 CAPSULE | Refills: 0 | Status: ACTIVE | COMMUNITY

## 2022-11-08 RX ORDER — LACTULOSE 10 G/15ML
15 ML SOLUTION ORAL ONCE A DAY
Qty: 1350 ML | Refills: 3 | Status: ACTIVE | COMMUNITY

## 2022-11-08 RX ORDER — PANTOPRAZOLE SODIUM 40 MG/1
TAKE 1 TABLET BY MOUTH EVERY DAY TABLET, DELAYED RELEASE ORAL
Qty: 90 TABLET | Refills: 0 | Status: ACTIVE | COMMUNITY

## 2022-11-08 RX ORDER — ONDANSETRON 4 MG/1
1 TABLET ON THE TONGUE AND ALLOW TO DISSOLVE TABLET, ORALLY DISINTEGRATING ORAL ONCE A DAY
Qty: 60 TABLET | Refills: 3 | OUTPATIENT
Start: 2022-11-09

## 2022-11-18 ENCOUNTER — TELEPHONE ENCOUNTER (OUTPATIENT)
Dept: URBAN - METROPOLITAN AREA CLINIC 63 | Facility: CLINIC | Age: 47
End: 2022-11-18

## 2022-12-06 ENCOUNTER — TELEPHONE ENCOUNTER (OUTPATIENT)
Dept: URBAN - METROPOLITAN AREA CLINIC 118 | Facility: CLINIC | Age: 47
End: 2022-12-06

## 2022-12-06 RX ORDER — LACTULOSE 10 G/15ML
15 ML SOLUTION ORAL THREE TIMES A DAY
Qty: 4050 ML | Refills: 3

## 2022-12-15 ENCOUNTER — TELEPHONE ENCOUNTER (OUTPATIENT)
Dept: URBAN - METROPOLITAN AREA CLINIC 118 | Facility: CLINIC | Age: 47
End: 2022-12-15

## 2023-02-23 ENCOUNTER — OFFICE VISIT (OUTPATIENT)
Dept: URBAN - METROPOLITAN AREA CLINIC 118 | Facility: CLINIC | Age: 48
End: 2023-02-23
Payer: MEDICARE

## 2023-02-23 ENCOUNTER — DASHBOARD ENCOUNTERS (OUTPATIENT)
Age: 48
End: 2023-02-23

## 2023-02-23 VITALS
HEIGHT: 68 IN | HEART RATE: 73 BPM | TEMPERATURE: 98.1 F | SYSTOLIC BLOOD PRESSURE: 120 MMHG | DIASTOLIC BLOOD PRESSURE: 78 MMHG | WEIGHT: 204.6 LBS | BODY MASS INDEX: 31.01 KG/M2

## 2023-02-23 DIAGNOSIS — K70.30 ALCOHOLIC CIRRHOSIS OF LIVER WITHOUT ASCITES: ICD-10-CM

## 2023-02-23 DIAGNOSIS — K76.9 LIVER LESION: ICD-10-CM

## 2023-02-23 DIAGNOSIS — A04.8 H. PYLORI INFECTION: ICD-10-CM

## 2023-02-23 DIAGNOSIS — C22.0 HCC (HEPATOCELLULAR CARCINOMA): ICD-10-CM

## 2023-02-23 DIAGNOSIS — I85.00 ESOPHAGEAL VARICES DETERMINED BY ENDOSCOPY: ICD-10-CM

## 2023-02-23 DIAGNOSIS — K26.9 DUODENAL ULCER: ICD-10-CM

## 2023-02-23 DIAGNOSIS — D49.0 IPMN (INTRADUCTAL PAPILLARY MUCINOUS NEOPLASM): ICD-10-CM

## 2023-02-23 PROBLEM — 300331000: Status: ACTIVE | Noted: 2022-06-20

## 2023-02-23 PROBLEM — 420054005: Status: ACTIVE | Noted: 2022-03-21

## 2023-02-23 PROBLEM — 51868009: Status: ACTIVE | Noted: 2022-05-02

## 2023-02-23 PROBLEM — 28670008: Status: ACTIVE | Noted: 2022-06-20

## 2023-02-23 PROBLEM — 109841003: Status: ACTIVE | Noted: 2022-08-15

## 2023-02-23 PROCEDURE — 99214 OFFICE O/P EST MOD 30 MIN: CPT | Performed by: INTERNAL MEDICINE

## 2023-02-23 RX ORDER — LACTULOSE 10 G/15ML
15 ML SOLUTION ORAL ONCE A DAY
Qty: 1350 ML | Refills: 3

## 2023-02-23 RX ORDER — LACTULOSE 10 G/15ML
15 ML SOLUTION ORAL THREE TIMES A DAY
Qty: 4050 ML | Refills: 3 | COMMUNITY

## 2023-02-23 RX ORDER — OMEPRAZOLE 20 MG/1
1 CAPSULE CAPSULE, DELAYED RELEASE ORAL BID
Qty: 28 CAPSULE | Refills: 0 | COMMUNITY

## 2023-02-23 RX ORDER — PANTOPRAZOLE SODIUM 40 MG/1
TAKE 1 TABLET BY MOUTH EVERY DAY TABLET, DELAYED RELEASE ORAL
Qty: 90 TABLET | Refills: 0 | Status: ACTIVE | COMMUNITY

## 2023-02-23 RX ORDER — ONDANSETRON 4 MG/1
1 TABLET ON THE TONGUE AND ALLOW TO DISSOLVE TABLET, ORALLY DISINTEGRATING ORAL ONCE A DAY
Qty: 60 TABLET | Refills: 3 | COMMUNITY
Start: 2022-11-09

## 2023-02-23 RX ORDER — CHOLECALCIFEROL TAB 50 MCG (2000 UNIT) 50 MCG
ONCE PER WEEK TAB ORAL
Status: ACTIVE | COMMUNITY

## 2023-02-23 RX ORDER — MELOXICAM 15 MG/1
1 TABLET TABLET ORAL ONCE A DAY
Status: ACTIVE | COMMUNITY

## 2023-02-23 NOTE — HPI-TODAY'S VISIT:
I found HCC, referred to [facility] transplant: had ablation 10/2022  no followup notes since from them, he appears to have been lost to followup  he has limited understanding  Prior notes: [facility] Multidisciplinary Liver Cancer Conference Summary Report   Rell Lusi was discussed at our weekly multidisciplinary liver cancer conference using [employer] on 7/26/2022.   We presented and reviewed the patient's MRI 7/14/2022 with interventional radiologist Dr. Honorio Rojas and have the following findings and recommended treatment plan:     The images revealed:  -Arterial enhancing lesion with washout, located near heart. LR-5     Main portal vein patent?: yes, but not mentioned in discussion.             If PV thrombus, is it tumor thrombus?: not applicable  HCC within [place] Criteria?: not applicable; NAC for transplant.     The multidisciplinary care recommendation is:  Pt needs to schedule office visit with Dr. Rosales in next 1-2 weeks. Plan for Y-90 if tbili ok. If tbili Marginal, plan for TACE.   Note routed to primary team to inform patient of above.   Electronically signed by Kimberley Sorto RN on 7/27/2022 10:45 AM.    here to treat his HPylori I diagnosed still reports some pruritis reports abd bloating   TO REVIEW S/P CCY 2022 referred for cirrhosis labs from 2021 shows no HBV/HCV needs twinrix no recent labs for MELD  reports heavy etoh in the past last etoh was about 2 years ago  PeTH 5/2021 was neg  MRI 8/2020    1. Hepatic cirrhosis with portal hypertension evidenced by collateral venous channels including paraesophageal varices and spontaneous splenorenal shunt and splenomegaly. 2. No focal liver mass identified to suggest hepatocellular carcinoma.    CT 7/2020  1. Cirrhosis with sequela of portal hypertension in the form of extensive upper abdominal varices and splenomegaly. 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 Some small ill-defined liver hypodensities are indeterminate. Recommend characterization with nonemergent contrast-enhanced abdominal MRI.   2. CBD is borderline dilated up to 8 mm with mild central intrahepatic biliary ductal dilatation. No conspicuous obstructing mass or calculus by CT. MRI/MRCP should be considered if there is concern for biliary obstruction.   3. Nonspecific hypodensity along the posterior margin of the gastric pylorus and anterior margin of the pancreatic head. Pancreatitis considered unlikely given today's normal lipase level. Findings could reflect sequela of gastritis/peptic ulcer disease. The posterior aspect of the gastric pyloric wall is ill-defined but there are no overt findings of perforation or drainable collection. Attention on follow-up imaging recommended to ensure resolution.   4. Multiple compression deformities of thoracolumbar vertebral bodies. L1 vertebral body compression deformity is age-indeterminate.   5. Other incidental findings as above.   Approved By: Cricket Salinas MD  7/11/2020 12:51 AM  [id number]   7/2020  Cholelithiasis with trace pericholecystic fluid. Mild intrahepatic biliary ductal dilatation. Findings are equivocal for acute cholecystitis. If there is clinical concern, consider HIDA scan.   Hepatic cirrhosis with a 1.8 cm solid mass in the right hepatic lobe. Recommend contrast-enhanced MRI of the abdomen for further evaluation as hepatocellular carcinoma is not excluded.  Dr HarperGD/colon 2019  POSTOPERATIVE DIAGNOSES: 1.  No polyps or masses found.  No bleeding source found.  2.  Diverticulosis, ascending and proximal transverse colon  3.  Medium sized, non-bleeding internal hemorrhoids   POSTOPERATIVE DIAGNOSES:  1.  Three columns, grade 2, non-bleeding varices with no stigmata of bleeding.   2.  Moderate portal hypertensive gastropathy, body and antrum of stomach.   3.  3 cm, clean based ulcer in duodenal bulb with small flat red spots but no visible vessels.   4.  1 cm lipoma in second portion of duodenum    RECOMMENDATIONS:  1.  Pantoprazole 40 mg PO BID  2.  Octreotide infusion at 50 mcg/hour IV for at least 72 hours  3.  Avoid aspirin and NSAID use 4.  Repeat EGD needed in 8 weeks  5.  Follow serial hgb levels.  Transfuse as needed to keep hgb > 7.  6.  Regular, high fiber, 2 gram sodium restricted diet 7.  Abstain from alcohol use.   8.  Nadolol 20 mg PO daily to reduce risk of future bleeding from esophageal varices, PHG.

## 2023-03-09 ENCOUNTER — ERX REFILL RESPONSE (OUTPATIENT)
Dept: URBAN - METROPOLITAN AREA CLINIC 118 | Facility: CLINIC | Age: 48
End: 2023-03-09

## 2023-03-09 RX ORDER — PANTOPRAZOLE SODIUM 40 MG/1
TAKE 1 TABLET EVERY DAY TABLET, DELAYED RELEASE ORAL
Qty: 90 TABLET | Refills: 0 | OUTPATIENT

## 2023-04-17 ENCOUNTER — OUT OF OFFICE VISIT (OUTPATIENT)
Dept: URBAN - METROPOLITAN AREA MEDICAL CENTER 41 | Facility: MEDICAL CENTER | Age: 48
End: 2023-04-17
Payer: MEDICARE

## 2023-04-17 DIAGNOSIS — Z85.05 PERSONAL HISTORY OF LIVER CANCER: ICD-10-CM

## 2023-04-17 DIAGNOSIS — K70.30 ALC (ALCOHOLIC LIVER CIRRHOSIS): ICD-10-CM

## 2023-04-17 PROCEDURE — 99222 1ST HOSP IP/OBS MODERATE 55: CPT | Performed by: INTERNAL MEDICINE

## 2023-04-17 PROCEDURE — G8427 DOCREV CUR MEDS BY ELIG CLIN: HCPCS | Performed by: INTERNAL MEDICINE

## 2023-04-30 ENCOUNTER — OUT OF OFFICE VISIT (OUTPATIENT)
Dept: URBAN - METROPOLITAN AREA MEDICAL CENTER 41 | Facility: MEDICAL CENTER | Age: 48
End: 2023-04-30
Payer: MEDICARE

## 2023-04-30 DIAGNOSIS — K76.82 HEPATIC ENCEPHALOPATHY: ICD-10-CM

## 2023-04-30 DIAGNOSIS — K72.00 ACUTE AND SUBACUTE HEPATIC FAILURE WITHOUT COMA: ICD-10-CM

## 2023-04-30 DIAGNOSIS — R18.8 ABDOMINAL ASCITES: ICD-10-CM

## 2023-04-30 PROCEDURE — 99232 SBSQ HOSP IP/OBS MODERATE 35: CPT | Performed by: INTERNAL MEDICINE

## 2023-05-01 ENCOUNTER — OFFICE VISIT (OUTPATIENT)
Dept: URBAN - METROPOLITAN AREA CLINIC 118 | Facility: CLINIC | Age: 48
End: 2023-05-01

## 2023-05-15 ENCOUNTER — OFFICE VISIT (OUTPATIENT)
Dept: URBAN - METROPOLITAN AREA CLINIC 118 | Facility: CLINIC | Age: 48
End: 2023-05-15

## 2023-05-22 ENCOUNTER — OUT OF OFFICE VISIT (OUTPATIENT)
Dept: URBAN - METROPOLITAN AREA MEDICAL CENTER 12 | Facility: MEDICAL CENTER | Age: 48
End: 2023-05-22
Payer: MEDICARE

## 2023-05-22 DIAGNOSIS — I85.10 ESOPH VARICE OTHER DIS: ICD-10-CM

## 2023-05-22 DIAGNOSIS — C22.0 HCC (HEPATOCELLULAR CARCINOMA): ICD-10-CM

## 2023-05-22 DIAGNOSIS — K70.30 ALC (ALCOHOLIC LIVER CIRRHOSIS): ICD-10-CM

## 2023-05-22 DIAGNOSIS — K76.6 CLINICALLY SIGNIFICANT PORTAL HYPERTENSION: ICD-10-CM

## 2023-05-22 PROCEDURE — 99232 SBSQ HOSP IP/OBS MODERATE 35: CPT | Performed by: PHYSICIAN ASSISTANT

## 2023-06-01 ENCOUNTER — OUT OF OFFICE VISIT (OUTPATIENT)
Dept: URBAN - METROPOLITAN AREA MEDICAL CENTER 12 | Facility: MEDICAL CENTER | Age: 48
End: 2023-06-01

## 2023-06-01 ENCOUNTER — CLAIMS CREATED FROM THE CLAIM WINDOW (OUTPATIENT)
Dept: URBAN - METROPOLITAN AREA MEDICAL CENTER 12 | Facility: MEDICAL CENTER | Age: 48
End: 2023-06-01
Payer: MEDICARE

## 2023-06-01 DIAGNOSIS — K70.31 ALCOHOLIC CIRRHOSIS OF LIVER WITH ASCITES: ICD-10-CM

## 2023-06-01 DIAGNOSIS — R13.19 CERVICAL DYSPHAGIA: ICD-10-CM

## 2023-06-01 PROCEDURE — 99232 SBSQ HOSP IP/OBS MODERATE 35: CPT | Performed by: PHYSICIAN ASSISTANT

## 2023-11-28 ENCOUNTER — OFFICE (OUTPATIENT)
Dept: URBAN - METROPOLITAN AREA CLINIC 10 | Facility: CLINIC | Age: 48
End: 2023-11-28

## 2023-11-28 VITALS
OXYGEN SATURATION: 98 % | HEIGHT: 71 IN | DIASTOLIC BLOOD PRESSURE: 85 MMHG | SYSTOLIC BLOOD PRESSURE: 144 MMHG | HEART RATE: 63 BPM | WEIGHT: 185 LBS

## 2023-11-28 DIAGNOSIS — F10.11 ALCOHOL ABUSE, IN REMISSION: ICD-10-CM

## 2023-11-28 DIAGNOSIS — K74.60 UNSPECIFIED CIRRHOSIS OF LIVER: ICD-10-CM

## 2023-11-28 DIAGNOSIS — C22.9 MALIGNANT NEOPLASM OF LIVER, NOT SPECIFIED AS PRIMARY OR SEC: ICD-10-CM

## 2023-11-28 PROCEDURE — 99204 OFFICE O/P NEW MOD 45 MIN: CPT

## 2023-11-30 LAB
ACTIN (SMOOTH MUSCLE) ANTIBODY: 29 UNITS — HIGH (ref 0–19)
AFP, SERUM, TUMOR MARKER: 1.8 NG/ML (ref 0–6.9)
ALPHA-1-ANTITRYPSIN, SERUM: 149 MG/DL (ref 101–187)
ANA BY IFA RFX TITER/PATTERN: NEGATIVE
CBC, PLATELET, NO DIFFERENTIAL: HEMATOCRIT: 40.9 % (ref 37.5–51)
CBC, PLATELET, NO DIFFERENTIAL: HEMOGLOBIN: 13.7 G/DL (ref 13–17.7)
CBC, PLATELET, NO DIFFERENTIAL: MCH: 33.2 PG — HIGH (ref 26.6–33)
CBC, PLATELET, NO DIFFERENTIAL: MCHC: 33.5 G/DL (ref 31.5–35.7)
CBC, PLATELET, NO DIFFERENTIAL: MCV: 99 FL — HIGH (ref 79–97)
CBC, PLATELET, NO DIFFERENTIAL: PLATELETS: 126 X10E3/UL — LOW (ref 150–450)
CBC, PLATELET, NO DIFFERENTIAL: RBC: 4.13 X10E6/UL — LOW (ref 4.14–5.8)
CBC, PLATELET, NO DIFFERENTIAL: RDW: 12.3 % (ref 11.6–15.4)
CBC, PLATELET, NO DIFFERENTIAL: WBC: 5.1 X10E3/UL (ref 3.4–10.8)
CERULOPLASMIN: 25.6 MG/DL (ref 16–31)
COMP. METABOLIC PANEL (14): A/G RATIO: 0.8 — LOW (ref 1.2–2.2)
COMP. METABOLIC PANEL (14): ALBUMIN: 3.3 G/DL — LOW (ref 4.1–5.1)
COMP. METABOLIC PANEL (14): ALKALINE PHOSPHATASE: 241 IU/L — HIGH (ref 44–121)
COMP. METABOLIC PANEL (14): ALT (SGPT): 50 IU/L — HIGH (ref 0–44)
COMP. METABOLIC PANEL (14): AST (SGOT): 76 IU/L — HIGH (ref 0–40)
COMP. METABOLIC PANEL (14): BILIRUBIN, TOTAL: 1.9 MG/DL — HIGH (ref 0–1.2)
COMP. METABOLIC PANEL (14): BUN/CREATININE RATIO: 10 (ref 9–20)
COMP. METABOLIC PANEL (14): BUN: 8 MG/DL (ref 6–24)
COMP. METABOLIC PANEL (14): CALCIUM: 9.2 MG/DL (ref 8.7–10.2)
COMP. METABOLIC PANEL (14): CARBON DIOXIDE, TOTAL: 23 MMOL/L (ref 20–29)
COMP. METABOLIC PANEL (14): CHLORIDE: 95 MMOL/L — LOW (ref 96–106)
COMP. METABOLIC PANEL (14): CREATININE: 0.77 MG/DL (ref 0.76–1.27)
COMP. METABOLIC PANEL (14): EGFR: 109 ML/MIN/1.73 (ref 59–?)
COMP. METABOLIC PANEL (14): GLOBULIN, TOTAL: 4.2 G/DL (ref 1.5–4.5)
COMP. METABOLIC PANEL (14): GLUCOSE: 433 MG/DL — HIGH (ref 70–99)
COMP. METABOLIC PANEL (14): POTASSIUM: 4.4 MMOL/L (ref 3.5–5.2)
COMP. METABOLIC PANEL (14): PROTEIN, TOTAL: 7.5 G/DL (ref 6–8.5)
COMP. METABOLIC PANEL (14): SODIUM: 126 MMOL/L — LOW (ref 134–144)
FERRITIN: 1031 NG/ML — HIGH (ref 30–400)
GGT: 47 IU/L (ref 0–65)
HBSAG SCREEN: NEGATIVE
HCV ANTIBODY CASCADE(PCR/GENO): HCV AB: NON REACTIVE
HEP A AB, TOTAL: POSITIVE
HEP B CORE AB, TOT: NEGATIVE
HEP B SURFACE AB, QUAL: NON REACTIVE
HIV AB/P24 AG WITH REFLEX: HIV AB/P24 AG SCREEN: NON REACTIVE
INTERPRETATION: (no result)
MITOCHONDRIAL (M2) ANTIBODY: <20 UNITS
PROTHROMBIN TIME (PT): INR: 1.5 — HIGH (ref 0.9–1.2)
PROTHROMBIN TIME (PT): PROTHROMBIN TIME: 15.2 SEC — HIGH (ref 9.1–12)

## 2024-03-15 ENCOUNTER — AMBULATORY SURGICAL CENTER (OUTPATIENT)
Dept: URBAN - METROPOLITAN AREA SURGERY 1 | Facility: SURGERY | Age: 49
End: 2024-03-15
Payer: MEDICARE

## 2024-03-15 ENCOUNTER — OFFICE (OUTPATIENT)
Dept: URBAN - METROPOLITAN AREA PATHOLOGY 12 | Facility: PATHOLOGY | Age: 49
End: 2024-03-15
Payer: MEDICARE

## 2024-03-15 ENCOUNTER — AMBULATORY SURGICAL CENTER (OUTPATIENT)
Dept: URBAN - METROPOLITAN AREA SURGERY 1 | Facility: SURGERY | Age: 49
End: 2024-03-15
Payer: MEDICAID

## 2024-03-15 VITALS
OXYGEN SATURATION: 99 % | SYSTOLIC BLOOD PRESSURE: 112 MMHG | RESPIRATION RATE: 18 BRPM | RESPIRATION RATE: 15 BRPM | SYSTOLIC BLOOD PRESSURE: 107 MMHG | DIASTOLIC BLOOD PRESSURE: 59 MMHG | DIASTOLIC BLOOD PRESSURE: 62 MMHG | HEART RATE: 75 BPM | SYSTOLIC BLOOD PRESSURE: 133 MMHG | HEART RATE: 74 BPM | OXYGEN SATURATION: 93 % | SYSTOLIC BLOOD PRESSURE: 105 MMHG | DIASTOLIC BLOOD PRESSURE: 73 MMHG | HEART RATE: 75 BPM | HEART RATE: 76 BPM | DIASTOLIC BLOOD PRESSURE: 62 MMHG | RESPIRATION RATE: 20 BRPM | SYSTOLIC BLOOD PRESSURE: 105 MMHG | TEMPERATURE: 98.5 F | HEART RATE: 74 BPM | OXYGEN SATURATION: 98 % | HEART RATE: 72 BPM | SYSTOLIC BLOOD PRESSURE: 97 MMHG | HEART RATE: 76 BPM | SYSTOLIC BLOOD PRESSURE: 133 MMHG | HEART RATE: 76 BPM | RESPIRATION RATE: 15 BRPM | HEART RATE: 78 BPM | RESPIRATION RATE: 16 BRPM | SYSTOLIC BLOOD PRESSURE: 133 MMHG | HEART RATE: 75 BPM | OXYGEN SATURATION: 99 % | DIASTOLIC BLOOD PRESSURE: 59 MMHG | SYSTOLIC BLOOD PRESSURE: 112 MMHG | SYSTOLIC BLOOD PRESSURE: 97 MMHG | TEMPERATURE: 98.5 F | RESPIRATION RATE: 18 BRPM | WEIGHT: 189 LBS | OXYGEN SATURATION: 98 % | DIASTOLIC BLOOD PRESSURE: 73 MMHG | SYSTOLIC BLOOD PRESSURE: 105 MMHG | OXYGEN SATURATION: 97 % | DIASTOLIC BLOOD PRESSURE: 55 MMHG | HEART RATE: 72 BPM | OXYGEN SATURATION: 98 % | HEIGHT: 71 IN | WEIGHT: 189 LBS | DIASTOLIC BLOOD PRESSURE: 55 MMHG | SYSTOLIC BLOOD PRESSURE: 107 MMHG | RESPIRATION RATE: 20 BRPM | HEART RATE: 78 BPM | HEART RATE: 78 BPM | TEMPERATURE: 98 F | DIASTOLIC BLOOD PRESSURE: 73 MMHG | OXYGEN SATURATION: 93 % | TEMPERATURE: 98.5 F | OXYGEN SATURATION: 93 % | DIASTOLIC BLOOD PRESSURE: 55 MMHG | RESPIRATION RATE: 16 BRPM | WEIGHT: 189 LBS | OXYGEN SATURATION: 97 % | SYSTOLIC BLOOD PRESSURE: 107 MMHG | HEIGHT: 71 IN | RESPIRATION RATE: 18 BRPM | DIASTOLIC BLOOD PRESSURE: 58 MMHG | HEART RATE: 74 BPM | TEMPERATURE: 98 F | SYSTOLIC BLOOD PRESSURE: 97 MMHG | DIASTOLIC BLOOD PRESSURE: 62 MMHG | RESPIRATION RATE: 16 BRPM | SYSTOLIC BLOOD PRESSURE: 112 MMHG | DIASTOLIC BLOOD PRESSURE: 58 MMHG | OXYGEN SATURATION: 97 % | DIASTOLIC BLOOD PRESSURE: 59 MMHG | HEART RATE: 72 BPM | HEIGHT: 71 IN | OXYGEN SATURATION: 99 % | TEMPERATURE: 98 F | DIASTOLIC BLOOD PRESSURE: 58 MMHG | RESPIRATION RATE: 15 BRPM | RESPIRATION RATE: 20 BRPM

## 2024-03-15 DIAGNOSIS — I85.00 ESOPHAGEAL VARICES WITHOUT BLEEDING: ICD-10-CM

## 2024-03-15 DIAGNOSIS — K26.9 DUODENAL ULCER, UNSPECIFIED AS ACUTE OR CHRONIC, WITHOUT HEM: ICD-10-CM

## 2024-03-15 DIAGNOSIS — K31.89 OTHER DISEASES OF STOMACH AND DUODENUM: ICD-10-CM

## 2024-03-15 DIAGNOSIS — K57.30 DIVERTICULOSIS OF LARGE INTESTINE WITHOUT PERFORATION OR ABS: ICD-10-CM

## 2024-03-15 DIAGNOSIS — K52.9 NONINFECTIVE GASTROENTERITIS AND COLITIS, UNSPECIFIED: ICD-10-CM

## 2024-03-15 DIAGNOSIS — Z12.11 ENCOUNTER FOR SCREENING FOR MALIGNANT NEOPLASM OF COLON: ICD-10-CM

## 2024-03-15 DIAGNOSIS — K74.60 UNSPECIFIED CIRRHOSIS OF LIVER: ICD-10-CM

## 2024-03-15 DIAGNOSIS — K29.50 UNSPECIFIED CHRONIC GASTRITIS WITHOUT BLEEDING: ICD-10-CM

## 2024-03-15 PROCEDURE — G0121 COLON CA SCRN NOT HI RSK IND: HCPCS | Performed by: INTERNAL MEDICINE

## 2024-03-15 PROCEDURE — 43239 EGD BIOPSY SINGLE/MULTIPLE: CPT | Mod: 51 | Performed by: INTERNAL MEDICINE

## 2024-03-15 PROCEDURE — 88342 IMHCHEM/IMCYTCHM 1ST ANTB: CPT | Performed by: PATHOLOGY

## 2024-03-15 PROCEDURE — 88313 SPECIAL STAINS GROUP 2: CPT | Performed by: PATHOLOGY

## 2024-03-15 PROCEDURE — 88305 TISSUE EXAM BY PATHOLOGIST: CPT | Performed by: PATHOLOGY

## 2024-03-15 RX ORDER — PANTOPRAZOLE 40 MG/1
TABLET, DELAYED RELEASE ORAL
Qty: 60 | Refills: 6 | Status: COMPLETED
Start: 2024-03-15 | End: 2024-05-28

## 2024-03-15 RX ADMIN — PROPOFOL 300 MG: 10 INJECTION, EMULSION INTRAVENOUS at 09:22

## 2024-03-20 LAB
GASTRO ONE PATHOLOGY: PDF REPORT: (no result)

## 2024-05-28 ENCOUNTER — OFFICE (OUTPATIENT)
Dept: URBAN - METROPOLITAN AREA CLINIC 10 | Facility: CLINIC | Age: 49
End: 2024-05-28
Payer: MEDICAID

## 2024-05-28 VITALS
DIASTOLIC BLOOD PRESSURE: 75 MMHG | OXYGEN SATURATION: 97 % | HEART RATE: 67 BPM | WEIGHT: 201 LBS | SYSTOLIC BLOOD PRESSURE: 121 MMHG | HEIGHT: 71 IN

## 2024-05-28 DIAGNOSIS — C22.9 MALIGNANT NEOPLASM OF LIVER, NOT SPECIFIED AS PRIMARY OR SEC: ICD-10-CM

## 2024-05-28 DIAGNOSIS — K74.60 UNSPECIFIED CIRRHOSIS OF LIVER: ICD-10-CM

## 2024-05-28 DIAGNOSIS — K26.9 DUODENAL ULCER, UNSPECIFIED AS ACUTE OR CHRONIC, WITHOUT HEM: ICD-10-CM

## 2024-05-28 DIAGNOSIS — I85.00 ESOPHAGEAL VARICES WITHOUT BLEEDING: ICD-10-CM

## 2024-05-28 PROCEDURE — 99214 OFFICE O/P EST MOD 30 MIN: CPT

## 2024-06-04 ENCOUNTER — OFFICE (OUTPATIENT)
Dept: URBAN - METROPOLITAN AREA CLINIC 19 | Facility: CLINIC | Age: 49
End: 2024-06-04
Payer: MEDICAID

## 2024-06-04 DIAGNOSIS — C22.9 MALIGNANT NEOPLASM OF LIVER, NOT SPECIFIED AS PRIMARY OR SEC: ICD-10-CM

## 2024-06-04 DIAGNOSIS — K74.60 UNSPECIFIED CIRRHOSIS OF LIVER: ICD-10-CM

## 2024-06-04 PROCEDURE — 76705 ECHO EXAM OF ABDOMEN: CPT | Mod: TC

## 2024-06-07 ENCOUNTER — AMBULATORY SURGICAL CENTER (OUTPATIENT)
Dept: URBAN - METROPOLITAN AREA SURGERY 1 | Facility: SURGERY | Age: 49
End: 2024-06-07
Payer: MEDICAID

## 2024-06-07 ENCOUNTER — AMBULATORY SURGICAL CENTER (OUTPATIENT)
Dept: URBAN - METROPOLITAN AREA SURGERY 1 | Facility: SURGERY | Age: 49
End: 2024-06-07
Payer: MEDICARE

## 2024-06-07 VITALS
OXYGEN SATURATION: 96 % | TEMPERATURE: 97.3 F | WEIGHT: 200 LBS | OXYGEN SATURATION: 99 % | HEART RATE: 60 BPM | SYSTOLIC BLOOD PRESSURE: 137 MMHG | OXYGEN SATURATION: 93 % | SYSTOLIC BLOOD PRESSURE: 122 MMHG | SYSTOLIC BLOOD PRESSURE: 122 MMHG | DIASTOLIC BLOOD PRESSURE: 73 MMHG | DIASTOLIC BLOOD PRESSURE: 77 MMHG | WEIGHT: 200 LBS | DIASTOLIC BLOOD PRESSURE: 58 MMHG | SYSTOLIC BLOOD PRESSURE: 137 MMHG | OXYGEN SATURATION: 97 % | SYSTOLIC BLOOD PRESSURE: 114 MMHG | SYSTOLIC BLOOD PRESSURE: 114 MMHG | DIASTOLIC BLOOD PRESSURE: 77 MMHG | OXYGEN SATURATION: 94 % | DIASTOLIC BLOOD PRESSURE: 73 MMHG | HEART RATE: 66 BPM | HEART RATE: 61 BPM | RESPIRATION RATE: 18 BRPM | SYSTOLIC BLOOD PRESSURE: 138 MMHG | HEIGHT: 71 IN | OXYGEN SATURATION: 93 % | HEIGHT: 71 IN | OXYGEN SATURATION: 93 % | DIASTOLIC BLOOD PRESSURE: 77 MMHG | HEART RATE: 62 BPM | OXYGEN SATURATION: 94 % | TEMPERATURE: 97.3 F | OXYGEN SATURATION: 97 % | SYSTOLIC BLOOD PRESSURE: 137 MMHG | SYSTOLIC BLOOD PRESSURE: 128 MMHG | OXYGEN SATURATION: 96 % | SYSTOLIC BLOOD PRESSURE: 114 MMHG | TEMPERATURE: 97.6 F | TEMPERATURE: 97.6 F | SYSTOLIC BLOOD PRESSURE: 122 MMHG | OXYGEN SATURATION: 97 % | DIASTOLIC BLOOD PRESSURE: 79 MMHG | HEART RATE: 66 BPM | DIASTOLIC BLOOD PRESSURE: 58 MMHG | SYSTOLIC BLOOD PRESSURE: 128 MMHG | OXYGEN SATURATION: 96 % | OXYGEN SATURATION: 94 % | HEART RATE: 60 BPM | HEART RATE: 60 BPM | RESPIRATION RATE: 18 BRPM | SYSTOLIC BLOOD PRESSURE: 138 MMHG | TEMPERATURE: 97.6 F | HEART RATE: 62 BPM | WEIGHT: 200 LBS | SYSTOLIC BLOOD PRESSURE: 138 MMHG | DIASTOLIC BLOOD PRESSURE: 79 MMHG | OXYGEN SATURATION: 99 % | TEMPERATURE: 97.3 F | HEART RATE: 61 BPM | HEIGHT: 71 IN | OXYGEN SATURATION: 99 % | DIASTOLIC BLOOD PRESSURE: 58 MMHG | DIASTOLIC BLOOD PRESSURE: 79 MMHG | HEART RATE: 66 BPM | DIASTOLIC BLOOD PRESSURE: 73 MMHG | SYSTOLIC BLOOD PRESSURE: 128 MMHG | HEART RATE: 62 BPM | RESPIRATION RATE: 18 BRPM | HEART RATE: 61 BPM

## 2024-06-07 DIAGNOSIS — K31.89 OTHER DISEASES OF STOMACH AND DUODENUM: ICD-10-CM

## 2024-06-07 DIAGNOSIS — K31.5 OBSTRUCTION OF DUODENUM: ICD-10-CM

## 2024-06-07 DIAGNOSIS — I85.00 ESOPHAGEAL VARICES WITHOUT BLEEDING: ICD-10-CM

## 2024-06-07 DIAGNOSIS — K26.9 DUODENAL ULCER, UNSPECIFIED AS ACUTE OR CHRONIC, WITHOUT HEM: ICD-10-CM

## 2024-06-07 DIAGNOSIS — K74.60 UNSPECIFIED CIRRHOSIS OF LIVER: ICD-10-CM

## 2024-06-07 PROCEDURE — 43235 EGD DIAGNOSTIC BRUSH WASH: CPT | Performed by: INTERNAL MEDICINE
